# Patient Record
(demographics unavailable — no encounter records)

---

## 2024-10-10 NOTE — HEALTH RISK ASSESSMENT
[Good] : ~his/her~  mood as  good [No] : No [Never] : Never [Not at All (0)] : 9.) Thoughts that you would be off dead or of hurting yourself in some way? Not at all [With Family] : lives with family [] :  [# Of Children ___] : has [unfilled] children

## 2024-10-10 NOTE — PHYSICAL EXAM
[No Acute Distress] : no acute distress [Well Nourished] : well nourished [Normal Sclera/Conjunctiva] : normal sclera/conjunctiva [Normal Outer Ear/Nose] : the outer ears and nose were normal in appearance [Normal Oropharynx] : the oropharynx was normal [No JVD] : no jugular venous distention [No Lymphadenopathy] : no lymphadenopathy [Supple] : supple [No Respiratory Distress] : no respiratory distress  [Clear to Auscultation] : lungs were clear to auscultation bilaterally [Normal Rate] : normal rate  [Regular Rhythm] : with a regular rhythm [No Edema] : there was no peripheral edema [Soft] : abdomen soft [Non Tender] : non-tender [Non-distended] : non-distended [No HSM] : no HSM [Normal Bowel Sounds] : normal bowel sounds [Normal Posterior Cervical Nodes] : no posterior cervical lymphadenopathy [Normal Anterior Cervical Nodes] : no anterior cervical lymphadenopathy [No CVA Tenderness] : no CVA  tenderness [No Spinal Tenderness] : no spinal tenderness [No Joint Swelling] : no joint swelling [No Rash] : no rash [No Focal Deficits] : no focal deficits [Normal Gait] : normal gait [Normal Affect] : the affect was normal [Normal Insight/Judgement] : insight and judgment were intact

## 2024-10-10 NOTE — PLAN
[FreeTextEntry1] : Check labs script given for Mammogram EKG wnl Declined Flu vaccine Tdap today Referral given for GYN and GI for PAP and colonoscopy Trial of Flexeril Rec Shingrinx vaccine.

## 2024-10-10 NOTE — HISTORY OF PRESENT ILLNESS
[de-identified] : 52 yr old female here for CPE today. She is not on any medications. 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 Wishes to check hormone levels today. Last saw GYN in [place] in 2022, denies having a PAP smear before.

## 2025-04-02 NOTE — PHYSICAL EXAM
[Right] : right shoulder [Sitting] : sitting [Mild] : mild [5 ___] : forward flexion 5[unfilled]/5 [5___] : internal rotation 5[unfilled]/5 [] : no sensory deficits [FreeTextEntry9] : IR: T12.  [TWNoteComboBox4] : passive forward flexion 170 degrees [de-identified] : external rotation 90 degrees

## 2025-04-02 NOTE — IMAGING
[Right] : right shoulder [FreeTextEntry1] : There is good joint space.  [FreeTextEntry5] : There is a type I - II acromion.

## 2025-04-02 NOTE — CONSULT LETTER
[Dear  ___] : Dear  [unfilled], [FreeTextEntry1] : Thank you for referring your patient for consultation.  Please see my note below.   If you have any questions, please do not hesitate to contact me.   Sincerely,   Sukumar Durham M.D. Shoulder Surgery

## 2025-04-02 NOTE — HISTORY OF PRESENT ILLNESS
[8] : 8 [Intermittent] : intermittent [Full time] : Work status: full time [de-identified] : 4/2/2025-Pateint is here with worsening right shoulder pain. Last A1C 6.3. [FreeTextEntry1] : Right shoulder [FreeTextEntry7] : to her scapula, neck and down to the wrist [de-identified] : activity [de-identified] : Stretching

## 2025-04-02 NOTE — REASON FOR VISIT
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 No injury/falls. No prior treatment. Reaching is painful. Night symptoms can occur. There is no n/t. Bengay use as needed with temporary relief. No NSAIDs.  She is pre-diabetic (A1c: 6.2%).  On 4/2/25, her right shoulder pain persists. States her neck pain has increased as well without injury. There is pain radiating down her arm. The MDP we gave her in August 2024 helped while on it. She was unable to start PT for about 2 months after we saw her in August, but she did do about 3 months of PT which did not help.

## 2025-04-02 NOTE — DATA REVIEWED
[FreeTextEntry1] : --PREVIOUSLY REVIEWED:  X-rays of the cervical is as follows: Cervical Comments: There are degenerative changes.    X-rays of the right shoulder is as follows: Shoulder Comments: There is good joint space. Scapula Comments: There is a type I - II acromion.

## 2025-04-02 NOTE — ASSESSMENT
[FreeTextEntry1] : We discussed her course. This is chronic with exacerbation.  We will hold PT for now. Celebrex, 200mg, QD for 10-14 days is prescribed, with risks of GI symptoms reviewed. She will follow up with pain management for cervical issues.  Questions answered.  Patient was seen by Dr. Sukumar Durham, who determined the assessment and plan. Lupe BOOGIE, am scribing for Dr. Sukumar Durham in his presence for the chief complaint, physical exam, studies, assessment, and/or plan.

## 2025-04-03 NOTE — HISTORY OF PRESENT ILLNESS
[de-identified] : Pt went to urgent care- had URI had high blood pressure.  Diag with COVID. Took Paxlovid- feeling better.

## 2025-04-03 NOTE — HISTORY OF PRESENT ILLNESS
[de-identified] : Pt went to urgent care- had URI had high blood pressure.  Diag with COVID. Took Paxlovid- feeling better.

## 2025-04-03 NOTE — ASSESSMENT
[FreeTextEntry1] : Mild hypertension repeat blood pressure here normal.  Will continue to watch numbers.  Low-salt diet exercise patient to see cardiology for further evaluation repeat blood pressure 130/80

## 2025-04-09 NOTE — HISTORY OF PRESENT ILLNESS
[6] : 6 [Constant] : constant [Sleep] : sleep [de-identified] : 4/9/25: TAINA CORNELL is a 52 year female who comes in today with neck and upper back pain that started about 1 year ago without injury. Pain radiates to the back of the right shoulder and down the arm and into the ring finger.  There is numbness. Difficulty twisting the neck.   Loss of  and weakness noted in the hand.   She saw Dr espinoza in 8/2024 and has attended PT and tried MDP with temporary relief.  Had 3 months of PT.  Also tried celebrex and stopped a few days ago - some relief with this No injections/chirocare/acupuncture/prior spine surgery  Pmhx: None, borderline diabetes Pshx: Cataract surgery NKDA  No hx cancer  Occupation: Cash management in bank   xrays today T-spine - no visible fractures C-spine - no visible fractures

## 2025-04-09 NOTE — DISCUSSION/SUMMARY
[Medication Risks Reviewed] : Medication risks reviewed [de-identified] : reviewed the case and the imaging with the patient  cervical radiculopathy on the right side discussion of the condition and treatment options cautions discussed questions answered discussion of natural history of the condition and what the next step would be MRI C spine  has done PT without relief  celebrex refill  fu to review the MRI

## 2025-05-28 NOTE — HISTORY OF PRESENT ILLNESS
[6] : 6 [Constant] : constant [Sleep] : sleep [de-identified] : Dr. Hilton's note  4/9/25: TAINA CORNELL is a 52-year female who comes in today with neck and upper back pain that started about 1 year ago without injury. Pain radiates to the back of the right shoulder and down the arm and into the ring finger.  There is numbness. Difficulty twisting the neck.   Loss of  and weakness noted in the hand.   She saw Dr espinoza in 8/2024 and has attended PT and tried MDP with temporary relief.  Had 3 months of PT.  Also tried celebrex and stopped a few days ago - some relief with this No injections/chirocare/acupuncture/prior spine surgery  Pmhx: None, borderline diabetes Pshx: Cataract surgery NKDA  No hx cancer  Occupation: Cash management in bank  xrays today T-spine - no visible fractures C-spine - no visible fractures ============================================================================ Dr. Mccord's note 5/28/25 here today for an evaluation of her neck and lower back. Did physical therapy for neck with no relief. Reports worsening sxs since last visit with Dr. Hilton.  Has Hx of sciatica, radiating pain in RT leg.  No bb dysfunction.

## 2025-05-28 NOTE — ASSESSMENT
[FreeTextEntry1] : PT, meds  L MRI to r/o HNP f/u to discuss results  Gabapentin- Patient advised of sedating effects, instructed not to drive, operate machinery, or take with other sedating medications. Advised of need to taper on/off medication and risk of abruptly stopping gabapentin.